# Patient Record
Sex: FEMALE | Race: WHITE | NOT HISPANIC OR LATINO | URBAN - METROPOLITAN AREA
[De-identification: names, ages, dates, MRNs, and addresses within clinical notes are randomized per-mention and may not be internally consistent; named-entity substitution may affect disease eponyms.]

---

## 2023-05-03 VITALS
SYSTOLIC BLOOD PRESSURE: 177 MMHG | RESPIRATION RATE: 16 BRPM | WEIGHT: 175.05 LBS | TEMPERATURE: 99 F | HEIGHT: 62 IN | OXYGEN SATURATION: 99 % | HEART RATE: 62 BPM | DIASTOLIC BLOOD PRESSURE: 89 MMHG

## 2023-05-03 RX ORDER — CHLORHEXIDINE GLUCONATE 213 G/1000ML
1 SOLUTION TOPICAL ONCE
Refills: 0 | Status: DISCONTINUED | OUTPATIENT
Start: 2023-05-04 | End: 2023-05-05

## 2023-05-03 NOTE — H&P ADULT - NSHPLABSRESULTS_GEN_ALL_CORE
14.3   6.93  )-----------( 203      ( 04 May 2023 11:57 )             41.5       05-04    139  |  104  |  16  ----------------------------<  93  4.0   |  24  |  0.94    Ca    9.8      04 May 2023 11:57  Mg     2.0     05-04    TPro  7.5  /  Alb  4.5  /  TBili  0.4  /  DBili  x   /  AST  23  /  ALT  20  /  AlkPhos  86  05-04      PT/INR - ( 04 May 2023 11:57 )   PT: 11.5 sec;   INR: 0.97          PTT - ( 04 May 2023 11:57 )  PTT:29.0 sec    CARDIAC MARKERS ( 04 May 2023 11:57 )  x     / x     / 77 U/L / x     / 2.7 ng/mL

## 2023-05-03 NOTE — H&P ADULT - HISTORY OF PRESENT ILLNESS
Cardiologist: Dr. Vides   Pharmacy:   Escort:     ***skeleton, please ask about surgery regarding breast CA****     72F PMHx OA, cerebral microvascular disease, breast CA (dx 10/22, clarify treatment/surgery), HLD, HTN, AI, CAD (+ CCTA 4/26/23)  presented to outpatient cardiologist, Dr. Vides, with complaints of CP w/ exertion. CCTA 4/26/23 Ca 91 (61%tile),  right dominant coronary arteries w/ noncalcified plaque in pRCA, 80-90% stenosis. Remained of atheromatous disease is nonobstructive. TTE 1/12/23 LVEF 60%, grade I DD, trace to mild MR, mild TR/AI, no RWMA. Patient denies KINSEY, SOB, palpitations, cough, abdominal pain, N/V/D, melena, BRBPR, LE swelling, fever, chills, or sick contacts.    In light of pts risk factors, CCS class III anginal symptoms and abnormal CCTA, patient now presents to [facility] for recommended cardiac catheterization with possible intervention if clinically indicated.   Cardiologist: Dr. Vides   Pharmacy: [place] [address]  Escort: Sarabjit ():  [phone]    73 y/o F, PMHx HTN, HLD, recently diagnosed Breast CA 10/2022, scheduled lumpectomy 5/8/23, initially presented to outpatient cardiologist, Dr. Vides, with complaints of CP w/ exertion, symptoms relieved with rest.  Subsequent CCTA 4/26/23 Ca 91 (61%tile),  right dominant coronary arteries w/ noncalcified plaque in pRCA, 80-90% stenosis. Remained of atheromatous disease is nonobstructive.  TTE 1/12/23 LVEF 60%, grade I DD, trace to mild MR, mild TR/AI, no RWMA. Patient denies Pt denies active CP, palpitations, diaphoresis, orthopnea, PND, dizziness, syncope, abdominal pain/discomfort, LE swelling or any other complaints at this time.     In light of pts risk factors, CCS class III anginal symptoms and abnormal CCTA, patient now presents to [facility] for recommended cardiac catheterization with possible intervention if clinically indicated with Dr Amaro.

## 2023-05-03 NOTE — H&P ADULT - NSICDXPASTMEDICALHX_GEN_ALL_CORE_FT
PAST MEDICAL HISTORY:  Aortic insufficiency     CAD (coronary artery disease)     Cerebral microvascular disease     HLD (hyperlipidemia)     HTN (hypertension)

## 2023-05-03 NOTE — H&P ADULT - ASSESSMENT
71 y/o F, PMHx HTN, HLD, recently diagnosed Breast CA 10/2022, scheduled lumpectomy 5/8/23, initially presented to outpatient cardiologist, Dr. Vides, with c/o class III anginal symptoms and found with subsequent CCTA  and now presents to [facility] for recommended cardiac catheterization with possible intervention if clinically indicated with Dr Amaro.     ASA:  III  Mallampati class: II	  Anginal Class III equivalent    Allergy Status: NKDA/NKFA  Office EKG 4/11/22:  SB @ 56bpm, no acute ischemic changes  --No  ASA/ Plavix:  Pt with scheduled Lumpectomy on Monday, 5/8/23.  (Per pt, breast area marked with magnet on 5/3/23)   --LABS:  Reviewed:  BUN/Cr : WNL.  EF preserved.  Euvolemic on exam. IV NS @  250cc bolus and 75cc x 2h started pre procedure    Sedation Plan:  Moderate    Patient Is Suitable Candidate For Sedation: Yes        Risks & benefits of procedure and sedation and risks and benefits for the alternative therapy have been explained to the patient in layman’s terms including but not limited to: allergic reaction, bleeding, infection, arrhythmia, respiratory compromise, renal and vascular compromise, limb damage, MI, CVA, emergent CABG/Vascular Surgery and death. Informed consent obtained and in chart.       71 y/o F, PMHx HTN, HLD, recently diagnosed Breast CA 10/2022, scheduled lumpectomy 5/8/23, initially presented to outpatient cardiologist, Dr. Vides, with c/o class III anginal symptoms and found with subsequent CCTA  and now presents to [facility] for recommended cardiac catheterization with possible intervention if clinically indicated with Dr Amaro.     ASA:  III  Mallampati class: II	  Anginal Class III equivalent    Allergy Status: NKDA/NKFA  Office EKG 4/11/22:  SB @ 56bpm, no acute ischemic changes  --No  ASA/ Plavix load given precath, Dr Amaro made aware. Pt with scheduled Lumpectomy on Monday, 5/8/23.  (Per pt, breast margins marked with magnet on 5/3/23. dressing intact)   --LABS:  Reviewed:  BUN/Cr : WNL.  EF preserved.  Euvolemic on exam. IV NS @  250cc bolus and 75cc x 2h started pre procedure    Sedation Plan:  Moderate    Patient Is Suitable Candidate For Sedation: Yes        Risks & benefits of procedure and sedation and risks and benefits for the alternative therapy have been explained to the patient in layman’s terms including but not limited to: allergic reaction, bleeding, infection, arrhythmia, respiratory compromise, renal and vascular compromise, limb damage, MI, CVA, emergent CABG/Vascular Surgery and death. Informed consent obtained and in chart.

## 2023-05-04 ENCOUNTER — INPATIENT (INPATIENT)
Facility: HOSPITAL | Age: 73
LOS: 0 days | Discharge: HOME | DRG: 247 | End: 2023-05-05
Attending: INTERNAL MEDICINE | Admitting: INTERNAL MEDICINE
Payer: COMMERCIAL

## 2023-05-04 LAB
A1C WITH ESTIMATED AVERAGE GLUCOSE RESULT: 5.1 % — SIGNIFICANT CHANGE UP (ref 4–5.6)
ALBUMIN SERPL ELPH-MCNC: 4.5 G/DL — SIGNIFICANT CHANGE UP (ref 3.3–5)
ALP SERPL-CCNC: 86 U/L — SIGNIFICANT CHANGE UP (ref 40–120)
ALT FLD-CCNC: 20 U/L — SIGNIFICANT CHANGE UP (ref 10–45)
ANION GAP SERPL CALC-SCNC: 11 MMOL/L — SIGNIFICANT CHANGE UP (ref 5–17)
APTT BLD: 29 SEC — SIGNIFICANT CHANGE UP (ref 27.5–35.5)
AST SERPL-CCNC: 23 U/L — SIGNIFICANT CHANGE UP (ref 10–40)
BASOPHILS # BLD AUTO: 0.05 K/UL — SIGNIFICANT CHANGE UP (ref 0–0.2)
BASOPHILS NFR BLD AUTO: 0.7 % — SIGNIFICANT CHANGE UP (ref 0–2)
BILIRUB SERPL-MCNC: 0.4 MG/DL — SIGNIFICANT CHANGE UP (ref 0.2–1.2)
BUN SERPL-MCNC: 16 MG/DL — SIGNIFICANT CHANGE UP (ref 7–23)
CALCIUM SERPL-MCNC: 9.8 MG/DL — SIGNIFICANT CHANGE UP (ref 8.4–10.5)
CHLORIDE SERPL-SCNC: 104 MMOL/L — SIGNIFICANT CHANGE UP (ref 96–108)
CHOLEST SERPL-MCNC: 217 MG/DL — HIGH
CK MB CFR SERPL CALC: 2.7 NG/ML — SIGNIFICANT CHANGE UP (ref 0–6.7)
CK SERPL-CCNC: 77 U/L — SIGNIFICANT CHANGE UP (ref 25–170)
CO2 SERPL-SCNC: 24 MMOL/L — SIGNIFICANT CHANGE UP (ref 22–31)
CREAT SERPL-MCNC: 0.94 MG/DL — SIGNIFICANT CHANGE UP (ref 0.5–1.3)
EGFR: 64 ML/MIN/1.73M2 — SIGNIFICANT CHANGE UP
EOSINOPHIL # BLD AUTO: 0.03 K/UL — SIGNIFICANT CHANGE UP (ref 0–0.5)
EOSINOPHIL NFR BLD AUTO: 0.4 % — SIGNIFICANT CHANGE UP (ref 0–6)
ESTIMATED AVERAGE GLUCOSE: 100 MG/DL — SIGNIFICANT CHANGE UP (ref 68–114)
GLUCOSE SERPL-MCNC: 93 MG/DL — SIGNIFICANT CHANGE UP (ref 70–99)
HCT VFR BLD CALC: 41.5 % — SIGNIFICANT CHANGE UP (ref 34.5–45)
HDLC SERPL-MCNC: 90 MG/DL — SIGNIFICANT CHANGE UP
HGB BLD-MCNC: 14.3 G/DL — SIGNIFICANT CHANGE UP (ref 11.5–15.5)
IMM GRANULOCYTES NFR BLD AUTO: 0.1 % — SIGNIFICANT CHANGE UP (ref 0–0.9)
INR BLD: 0.97 — SIGNIFICANT CHANGE UP (ref 0.88–1.16)
ISTAT ACTK (ACTIVATED CLOTTING TIME KAOLIN): 299 SEC — HIGH (ref 74–137)
LIPID PNL WITH DIRECT LDL SERPL: 109 MG/DL — HIGH
LYMPHOCYTES # BLD AUTO: 1.61 K/UL — SIGNIFICANT CHANGE UP (ref 1–3.3)
LYMPHOCYTES # BLD AUTO: 23.2 % — SIGNIFICANT CHANGE UP (ref 13–44)
MAGNESIUM SERPL-MCNC: 2 MG/DL — SIGNIFICANT CHANGE UP (ref 1.6–2.6)
MCHC RBC-ENTMCNC: 31 PG — SIGNIFICANT CHANGE UP (ref 27–34)
MCHC RBC-ENTMCNC: 34.5 GM/DL — SIGNIFICANT CHANGE UP (ref 32–36)
MCV RBC AUTO: 89.8 FL — SIGNIFICANT CHANGE UP (ref 80–100)
MONOCYTES # BLD AUTO: 0.54 K/UL — SIGNIFICANT CHANGE UP (ref 0–0.9)
MONOCYTES NFR BLD AUTO: 7.8 % — SIGNIFICANT CHANGE UP (ref 2–14)
NEUTROPHILS # BLD AUTO: 4.69 K/UL — SIGNIFICANT CHANGE UP (ref 1.8–7.4)
NEUTROPHILS NFR BLD AUTO: 67.8 % — SIGNIFICANT CHANGE UP (ref 43–77)
NON HDL CHOLESTEROL: 127 MG/DL — SIGNIFICANT CHANGE UP
NRBC # BLD: 0 /100 WBCS — SIGNIFICANT CHANGE UP (ref 0–0)
PLATELET # BLD AUTO: 203 K/UL — SIGNIFICANT CHANGE UP (ref 150–400)
POTASSIUM SERPL-MCNC: 4 MMOL/L — SIGNIFICANT CHANGE UP (ref 3.5–5.3)
POTASSIUM SERPL-SCNC: 4 MMOL/L — SIGNIFICANT CHANGE UP (ref 3.5–5.3)
PROT SERPL-MCNC: 7.5 G/DL — SIGNIFICANT CHANGE UP (ref 6–8.3)
PROTHROM AB SERPL-ACNC: 11.5 SEC — SIGNIFICANT CHANGE UP (ref 10.5–13.4)
RBC # BLD: 4.62 M/UL — SIGNIFICANT CHANGE UP (ref 3.8–5.2)
RBC # FLD: 12.4 % — SIGNIFICANT CHANGE UP (ref 10.3–14.5)
SODIUM SERPL-SCNC: 139 MMOL/L — SIGNIFICANT CHANGE UP (ref 135–145)
TRIGL SERPL-MCNC: 88 MG/DL — SIGNIFICANT CHANGE UP
WBC # BLD: 6.93 K/UL — SIGNIFICANT CHANGE UP (ref 3.8–10.5)
WBC # FLD AUTO: 6.93 K/UL — SIGNIFICANT CHANGE UP (ref 3.8–10.5)

## 2023-05-04 PROCEDURE — 92928 PRQ TCAT PLMT NTRAC ST 1 LES: CPT | Mod: RC

## 2023-05-04 PROCEDURE — 93458 L HRT ARTERY/VENTRICLE ANGIO: CPT | Mod: 26,59

## 2023-05-04 PROCEDURE — 93010 ELECTROCARDIOGRAM REPORT: CPT

## 2023-05-04 PROCEDURE — 99152 MOD SED SAME PHYS/QHP 5/>YRS: CPT

## 2023-05-04 RX ORDER — CLOPIDOGREL BISULFATE 75 MG/1
75 TABLET, FILM COATED ORAL DAILY
Refills: 0 | Status: DISCONTINUED | OUTPATIENT
Start: 2023-05-05 | End: 2023-05-05

## 2023-05-04 RX ORDER — SODIUM CHLORIDE 9 MG/ML
500 INJECTION INTRAMUSCULAR; INTRAVENOUS; SUBCUTANEOUS
Refills: 0 | Status: DISCONTINUED | OUTPATIENT
Start: 2023-05-04 | End: 2023-05-04

## 2023-05-04 RX ORDER — CLOPIDOGREL BISULFATE 75 MG/1
1 TABLET, FILM COATED ORAL
Refills: 0 | DISCHARGE

## 2023-05-04 RX ORDER — AMLODIPINE BESYLATE 2.5 MG/1
10 TABLET ORAL DAILY
Refills: 0 | Status: DISCONTINUED | OUTPATIENT
Start: 2023-05-04 | End: 2023-05-05

## 2023-05-04 RX ORDER — CYCLOBENZAPRINE HYDROCHLORIDE 10 MG/1
1 TABLET, FILM COATED ORAL
Refills: 0 | DISCHARGE

## 2023-05-04 RX ORDER — ASPIRIN/CALCIUM CARB/MAGNESIUM 324 MG
1 TABLET ORAL
Refills: 0 | DISCHARGE

## 2023-05-04 RX ORDER — RAMIPRIL 5 MG
1 CAPSULE ORAL
Refills: 0 | DISCHARGE

## 2023-05-04 RX ORDER — ATORVASTATIN CALCIUM 80 MG/1
40 TABLET, FILM COATED ORAL AT BEDTIME
Refills: 0 | Status: DISCONTINUED | OUTPATIENT
Start: 2023-05-04 | End: 2023-05-05

## 2023-05-04 RX ORDER — GABAPENTIN 400 MG/1
1 CAPSULE ORAL
Refills: 0 | DISCHARGE

## 2023-05-04 RX ORDER — METOPROLOL TARTRATE 50 MG
1 TABLET ORAL
Refills: 0 | DISCHARGE

## 2023-05-04 RX ORDER — ZOLPIDEM TARTRATE 10 MG/1
1 TABLET ORAL
Refills: 0 | DISCHARGE

## 2023-05-04 RX ORDER — SODIUM CHLORIDE 9 MG/ML
500 INJECTION INTRAMUSCULAR; INTRAVENOUS; SUBCUTANEOUS
Refills: 0 | Status: DISCONTINUED | OUTPATIENT
Start: 2023-05-04 | End: 2023-05-05

## 2023-05-04 RX ORDER — ASPIRIN/CALCIUM CARB/MAGNESIUM 324 MG
81 TABLET ORAL DAILY
Refills: 0 | Status: DISCONTINUED | OUTPATIENT
Start: 2023-05-05 | End: 2023-05-05

## 2023-05-04 RX ORDER — SODIUM CHLORIDE 9 MG/ML
250 INJECTION INTRAMUSCULAR; INTRAVENOUS; SUBCUTANEOUS ONCE
Refills: 0 | Status: DISCONTINUED | OUTPATIENT
Start: 2023-05-04 | End: 2023-05-04

## 2023-05-04 RX ORDER — RANOLAZINE 500 MG/1
1 TABLET, FILM COATED, EXTENDED RELEASE ORAL
Refills: 0 | DISCHARGE

## 2023-05-04 RX ADMIN — AMLODIPINE BESYLATE 10 MILLIGRAM(S): 2.5 TABLET ORAL at 19:21

## 2023-05-04 RX ADMIN — SODIUM CHLORIDE 235 MILLILITER(S): 9 INJECTION INTRAMUSCULAR; INTRAVENOUS; SUBCUTANEOUS at 18:18

## 2023-05-04 RX ADMIN — ATORVASTATIN CALCIUM 40 MILLIGRAM(S): 80 TABLET, FILM COATED ORAL at 22:19

## 2023-05-04 NOTE — PATIENT PROFILE ADULT - FALL HARM RISK - HARM RISK INTERVENTIONS

## 2023-05-05 VITALS — TEMPERATURE: 98 F

## 2023-05-05 LAB
ANION GAP SERPL CALC-SCNC: 9 MMOL/L — SIGNIFICANT CHANGE UP (ref 5–17)
BUN SERPL-MCNC: 14 MG/DL — SIGNIFICANT CHANGE UP (ref 7–23)
CALCIUM SERPL-MCNC: 8.3 MG/DL — LOW (ref 8.4–10.5)
CHLORIDE SERPL-SCNC: 108 MMOL/L — SIGNIFICANT CHANGE UP (ref 96–108)
CO2 SERPL-SCNC: 22 MMOL/L — SIGNIFICANT CHANGE UP (ref 22–31)
CREAT SERPL-MCNC: 0.88 MG/DL — SIGNIFICANT CHANGE UP (ref 0.5–1.3)
EGFR: 69 ML/MIN/1.73M2 — SIGNIFICANT CHANGE UP
GLUCOSE SERPL-MCNC: 103 MG/DL — HIGH (ref 70–99)
HCT VFR BLD CALC: 35.4 % — SIGNIFICANT CHANGE UP (ref 34.5–45)
HCV AB S/CO SERPL IA: 0.13 S/CO — SIGNIFICANT CHANGE UP (ref 0–0.99)
HCV AB SERPL-IMP: SIGNIFICANT CHANGE UP
HGB BLD-MCNC: 12.1 G/DL — SIGNIFICANT CHANGE UP (ref 11.5–15.5)
MAGNESIUM SERPL-MCNC: 1.9 MG/DL — SIGNIFICANT CHANGE UP (ref 1.6–2.6)
MCHC RBC-ENTMCNC: 31.4 PG — SIGNIFICANT CHANGE UP (ref 27–34)
MCHC RBC-ENTMCNC: 34.2 GM/DL — SIGNIFICANT CHANGE UP (ref 32–36)
MCV RBC AUTO: 91.9 FL — SIGNIFICANT CHANGE UP (ref 80–100)
NRBC # BLD: 0 /100 WBCS — SIGNIFICANT CHANGE UP (ref 0–0)
PLATELET # BLD AUTO: 167 K/UL — SIGNIFICANT CHANGE UP (ref 150–400)
POTASSIUM SERPL-MCNC: 4 MMOL/L — SIGNIFICANT CHANGE UP (ref 3.5–5.3)
POTASSIUM SERPL-SCNC: 4 MMOL/L — SIGNIFICANT CHANGE UP (ref 3.5–5.3)
RBC # BLD: 3.85 M/UL — SIGNIFICANT CHANGE UP (ref 3.8–5.2)
RBC # FLD: 12.4 % — SIGNIFICANT CHANGE UP (ref 10.3–14.5)
SODIUM SERPL-SCNC: 139 MMOL/L — SIGNIFICANT CHANGE UP (ref 135–145)
WBC # BLD: 5.49 K/UL — SIGNIFICANT CHANGE UP (ref 3.8–10.5)
WBC # FLD AUTO: 5.49 K/UL — SIGNIFICANT CHANGE UP (ref 3.8–10.5)

## 2023-05-05 PROCEDURE — 80048 BASIC METABOLIC PNL TOTAL CA: CPT

## 2023-05-05 PROCEDURE — C1874: CPT

## 2023-05-05 PROCEDURE — 85730 THROMBOPLASTIN TIME PARTIAL: CPT

## 2023-05-05 PROCEDURE — 85347 COAGULATION TIME ACTIVATED: CPT

## 2023-05-05 PROCEDURE — C1725: CPT

## 2023-05-05 PROCEDURE — 83036 HEMOGLOBIN GLYCOSYLATED A1C: CPT

## 2023-05-05 PROCEDURE — C1769: CPT

## 2023-05-05 PROCEDURE — 92928 PRQ TCAT PLMT NTRAC ST 1 LES: CPT

## 2023-05-05 PROCEDURE — 82550 ASSAY OF CK (CPK): CPT

## 2023-05-05 PROCEDURE — C1894: CPT

## 2023-05-05 PROCEDURE — 82553 CREATINE MB FRACTION: CPT

## 2023-05-05 PROCEDURE — C7517: CPT

## 2023-05-05 PROCEDURE — 99239 HOSP IP/OBS DSCHRG MGMT >30: CPT

## 2023-05-05 PROCEDURE — 85610 PROTHROMBIN TIME: CPT

## 2023-05-05 PROCEDURE — 93005 ELECTROCARDIOGRAM TRACING: CPT

## 2023-05-05 PROCEDURE — 85025 COMPLETE CBC W/AUTO DIFF WBC: CPT

## 2023-05-05 PROCEDURE — 93010 ELECTROCARDIOGRAM REPORT: CPT

## 2023-05-05 PROCEDURE — 80061 LIPID PANEL: CPT

## 2023-05-05 PROCEDURE — 36415 COLL VENOUS BLD VENIPUNCTURE: CPT

## 2023-05-05 PROCEDURE — 80053 COMPREHEN METABOLIC PANEL: CPT

## 2023-05-05 PROCEDURE — 86803 HEPATITIS C AB TEST: CPT

## 2023-05-05 PROCEDURE — 85027 COMPLETE CBC AUTOMATED: CPT

## 2023-05-05 PROCEDURE — 83735 ASSAY OF MAGNESIUM: CPT

## 2023-05-05 PROCEDURE — C1887: CPT

## 2023-05-05 RX ORDER — ATORVASTATIN CALCIUM 80 MG/1
1 TABLET, FILM COATED ORAL
Refills: 0 | DISCHARGE

## 2023-05-05 RX ORDER — MAGNESIUM OXIDE 400 MG ORAL TABLET 241.3 MG
400 TABLET ORAL ONCE
Refills: 0 | Status: COMPLETED | OUTPATIENT
Start: 2023-05-05 | End: 2023-05-05

## 2023-05-05 RX ORDER — CLOPIDOGREL BISULFATE 75 MG/1
1 TABLET, FILM COATED ORAL
Qty: 30 | Refills: 11
Start: 2023-05-05 | End: 2024-04-28

## 2023-05-05 RX ORDER — ATORVASTATIN CALCIUM 80 MG/1
1 TABLET, FILM COATED ORAL
Qty: 30 | Refills: 3
Start: 2023-05-05 | End: 2023-09-01

## 2023-05-05 RX ORDER — ASPIRIN/CALCIUM CARB/MAGNESIUM 324 MG
1 TABLET ORAL
Qty: 30 | Refills: 11
Start: 2023-05-05 | End: 2024-04-28

## 2023-05-05 RX ADMIN — MAGNESIUM OXIDE 400 MG ORAL TABLET 400 MILLIGRAM(S): 241.3 TABLET ORAL at 10:17

## 2023-05-05 RX ADMIN — CLOPIDOGREL BISULFATE 75 MILLIGRAM(S): 75 TABLET, FILM COATED ORAL at 10:17

## 2023-05-05 RX ADMIN — Medication 81 MILLIGRAM(S): at 10:17

## 2023-05-05 NOTE — DISCHARGE NOTE NURSING/CASE MANAGEMENT/SOCIAL WORK - NSDCPEFALRISK_GEN_ALL_CORE
For information on Fall & Injury Prevention, visit: [email or web address] OR  [email or web address] OR  https://www.cdc.gov/steadi/patient.html

## 2023-05-05 NOTE — DISCHARGE NOTE PROVIDER - NSDCFUADDINST_GEN_ALL_CORE_FT
We recommend a Mediterranean diet: Some suggestions include continue incorporating 2 or more servings per day of vegetables, fruits, and whole grains. Increase intake of fish and legumes/beans to 2 or more servings per week. Aim to increase intake of healthy fats, such as olive oil and avocados, and have a handful of nuts/seeds most days. Reduce red/processed meat consumption to 2 or fewer times per week

## 2023-05-05 NOTE — DISCHARGE NOTE PROVIDER - HOSPITAL COURSE
INCOMPLETE - NEEDS MED REC AND CARDS REHAB RX    71 y/o F, PMHx HTN, HLD, recently diagnosed Breast CA 10/2022, scheduled lumpectomy 5/8/23, initially presented to outpatient cardiologist, Dr. Vides, with complaints of CP w/ exertion, symptoms relieved with rest.  Subsequent CCTA 4/26/23 Ca 91 (61%tile),  right dominant coronary arteries w/ noncalcified plaque in pRCA, 80-90% stenosis. Remained of atheromatous disease is nonobstructive.  TTE 1/12/23 LVEF 60%, grade I DD, trace to mild MR, mild TR/AI, no RWMA. In light of pts risk factors, CCS class III anginal symptoms and abnormal CCTA, patient now presents to [facility] for recommended cardiac catheterization with possible intervention if clinically indicated with Dr Amaro.     S/p cardiac cath 5/423: JENNIFER pRCA (80%). Other findings: mLCx 20-30%, pLAD 30%, EDP 7mmHg, EF 60% by ECHO, R TR @ 7:30 PM     Pt planned for lumpectomy. She will continue DAPT for one month and then proceed with lumpectomy. She will then resume DAPT post surgery.    Pt is asymptomatic at this time and denies chest pain, SOB, KINSEY, palpitations, dizziness, LOC, N/V, diaphoresis, orthopnea/PND, and leg swelling. Pt able to ambulate and void without complication. VSS. Labs and telemetry reviewed. ___________ access site stable and dressing C/D/I.  Pt is a candidate for discharge per  ________. Pt given appropriate discharge instructions, pt states they have an appropriate amount of their previous home meds unchanged from this visit at home, and any new medications were sent to their pharmacy. Pt instructed to f/u with ________ in 1-2 weeks.           Cardiac Rehab (STEMI/NSTEMI/ACS/Unstable Angina/CHF/Post PCI):            *Education on benefits of Cardiac Rehab provided to patient: Yes         *Referral and Prescription Given for Cardiac Rehab : Yes         *Pt given list of locations & instructed to contact their insurance company to review list of participating providers    Statin Prescribed (STEMI/NSTEMI/UA &/OR PCI this admission):  Yes  DAPT: Prescriptions for Aspirin/Plavix/Brilinta/Effient e-prescribed to patient's pharmacy Yes   73 y/o F, PMHx HTN, HLD, recently diagnosed Breast CA 10/2022, scheduled lumpectomy 5/8/23, initially presented to outpatient cardiologist, Dr. Vides, with complaints of CP w/ exertion, symptoms relieved with rest.  Subsequent CCTA 4/26/23 Ca 91 (61%tile),  right dominant coronary arteries w/ noncalcified plaque in pRCA, 80-90% stenosis. Remained of atheromatous disease is nonobstructive.  TTE 1/12/23 LVEF 60%, grade I DD, trace to mild MR, mild TR/AI, no RWMA. In light of pts risk factors, CCS class III anginal symptoms and abnormal CCTA, patient now presents to [facility] for recommended cardiac catheterization with possible intervention if clinically indicated with Dr Amaro.     S/p cardiac cath 5/423: JENNIFER pRCA (80%). Other findings: mLCx 20-30%, pLAD 30%, EDP 7mmHg, EF 60% by ECHO, R radial access stable.      Pt planned for lumpectomy. She will continue DAPT for one month and then proceed with lumpectomy. She will then resume DAPT post surgery. She will otherwise continue home amlodipine 10mg qd. The statin therapy was increased from atorvastatin 20mg to 40mg qd ().     Pt is asymptomatic at this time and denies chest pain, SOB, KINSEY, palpitations, dizziness, LOC, N/V, diaphoresis, orthopnea/PND, and leg swelling. Pt able to ambulate and void without complication. VSS. Labs and telemetry reviewed. Right radial access site stable and dressing C/D/I.  Pt is a candidate for discharge per Dr. Khan. Pt given appropriate discharge instructions, pt states they have an appropriate amount of their previous home meds unchanged from this visit at home, and any new medications were sent to their pharmacy. Pt instructed to f/u with Dr. Vides in 1-2 weeks.           Cardiac Rehab (STEMI/NSTEMI/ACS/Unstable Angina/CHF/Post PCI):            *Education on benefits of Cardiac Rehab provided to patient: Yes         *Referral and Prescription Given for Cardiac Rehab : Yes         *Pt given list of locations & instructed to contact their insurance company to review list of participating providers    Statin Prescribed (STEMI/NSTEMI/UA &/OR PCI this admission):  Yes  DAPT: Prescriptions for Aspirin/Plavix/Brilinta/Effient e-prescribed to patient's pharmacy Yes   73 y/o F, PMHx HTN, HLD, recently diagnosed Breast CA 10/2022, scheduled lumpectomy 5/8/23, initially presented to outpatient cardiologist, Dr. Vides, with complaints of CP w/ exertion, symptoms relieved with rest.  Subsequent CCTA 4/26/23 Ca 91 (61%tile),  right dominant coronary arteries w/ noncalcified plaque in pRCA, 80-90% stenosis. Remained of atheromatous disease is nonobstructive.  TTE 1/12/23 LVEF 60%, grade I DD, trace to mild MR, mild TR/AI, no RWMA. In light of pts risk factors, CCS class III anginal symptoms and abnormal CCTA, patient now presents to [facility] for recommended cardiac catheterization with possible intervention if clinically indicated with Dr Amaro.     S/p cardiac cath 5/423: JENNIFER pRCA (80%). Other findings: mLCx 20-30%, pLAD 30%, EDP 7mmHg, EF 60% by ECHO, R radial access stable.      Pt planned for lumpectomy. She will continue DAPT for one month and then proceed with lumpectomy. She will then resume DAPT post surgery. She will otherwise continue home amlodipine 10mg qd. The statin therapy was increased from atorvastatin 20mg to 40mg qd ().     Pt is asymptomatic at this time and denies chest pain, SOB, KINSEY, palpitations, dizziness, LOC, N/V, diaphoresis, orthopnea/PND, and leg swelling. Pt able to ambulate and void without complication. VSS. Labs and telemetry reviewed. Right radial access site stable and dressing C/D/I.  Pt is a candidate for discharge per Dr. Khan. Pt given appropriate discharge instructions, pt states they have an appropriate amount of their previous home meds unchanged from this visit at home, and any new medications were sent to their pharmacy. Pt instructed to f/u with Dr. Vides in 1-2 weeks.           Cardiac Rehab (Post PCI):            *Education on benefits of Cardiac Rehab provided to patient: Yes         *Referral and Prescription Given for Cardiac Rehab : Yes         *Pt given list of locations & instructed to contact their insurance company to review list of participating providers    Statin Prescribed (STEMI/NSTEMI/UA &/OR PCI this admission):  Yes  DAPT: Prescriptions for Aspirin/Plavix e-prescribed to patient's pharmacy Yes

## 2023-05-05 NOTE — DISCHARGE NOTE PROVIDER - CARE PROVIDER_API CALL
Shilo Vides (MD)  Cardiovascular Disease  [address] [place] [address]  Phone: [phone]  Fax: [phone]  Established Patient  Follow Up Time: 2 weeks

## 2023-05-05 NOTE — DISCHARGE NOTE PROVIDER - NSDCMRMEDTOKEN_GEN_ALL_CORE_FT
amLODIPine 10 mg oral tablet: 1 orally once a day  Lipitor 10 mg oral tablet: 1 orally once a day   amLODIPine 10 mg oral tablet: 1 orally once a day  atorvastatin 40 mg oral tablet: 1 tab(s) orally once a day (at bedtime)  Lipitor 10 mg oral tablet: 1 orally once a day   amLODIPine 10 mg oral tablet: 1 orally once a day  Aspirin Enteric Coated 81 mg oral delayed release tablet: 1 tab(s) orally once a day  atorvastatin 40 mg oral tablet: 1 tab(s) orally once a day (at bedtime)  cardiac rehab, 3 x/week x 12 weeks for diagnosis CAD. Cardiologist Dr. Vides: cardiac rehab, 3 x/week x 12 weeks for diagnosis CAD. Cardiologist Dr. Vides  clopidogrel 75 mg oral tablet: 1 tab(s) orally once a day

## 2023-05-05 NOTE — DISCHARGE NOTE NURSING/CASE MANAGEMENT/SOCIAL WORK - PATIENT PORTAL LINK FT
You can access the FollowMyHealth Patient Portal offered by [facility] by registering at the following website: [email or web address]. By joining [employer]’s FollowMyHealth portal, you will also be able to view your health information using other applications (apps) compatible with our system.

## 2023-05-05 NOTE — DISCHARGE NOTE PROVIDER - NSDCCPCAREPLAN_GEN_ALL_CORE_FT
PRINCIPAL DISCHARGE DIAGNOSIS  Diagnosis: CAD (coronary artery disease)  Assessment and Plan of Treatment: -You underwent a cardiac catheterization 5/5/23 and the blockage in your right coronary artery was opened with stent placement. NEVER MISS A DOSE OF ASPIRIN OR PLAVIX; IF YOU DO, YOU ARE AT RISK OF YOUR STENT CLOSING AND HAVING A HEART ATTACK. DO NOT STOP THESE TWO MEDICATIONS UNLESS INSTRUCTED TO DO SO BY YOUR CARDIOLOGIST. Your procedure was done through your groin or your wrist. You do not need to keep this area covered and you may shower. Please avoid any heavy lifting  (no more than 3 to 5 lbs) or strenuous activity for five days. If you develop any swelling, bleeding, hardening of the skin (hematoma formation), acute pain, numbness/tingling  in your arm or leg please contact your doctor immediately or call our 24/7 line: [phone] Please return to the hospital/seek immediate medical attention if worsening of symptoms- including not limited to chest pain, shortness of breath. Please follow up with Dr. Vides in 1-2 weeks. Please call his office and make an appointment to see him. Please continue a heart healthy diet low in sodium, cholesterol, and fat.  - Please take aspirin and plavix daily. You may schedule the lumpectomy AFTER one month of taking aspirin and plavix. Please resume these medications after the surgery.     PRINCIPAL DISCHARGE DIAGNOSIS  Diagnosis: CAD (coronary artery disease)  Assessment and Plan of Treatment: -You underwent a cardiac catheterization 5/4/23 and the blockage in your right coronary artery was opened with stent placement. NEVER MISS A DOSE OF ASPIRIN OR PLAVIX; IF YOU DO, YOU ARE AT RISK OF YOUR STENT CLOSING AND HAVING A HEART ATTACK. DO NOT STOP THESE TWO MEDICATIONS UNLESS INSTRUCTED TO DO SO BY YOUR CARDIOLOGIST. Your procedure was done through your wrist. You do not need to keep this area covered and you may shower. Please avoid any heavy lifting  (no more than 3 to 5 lbs) or strenuous activity for five days. If you develop any swelling, bleeding, hardening of the skin (hematoma formation), acute pain, numbness/tingling  in your arm or leg please contact your doctor immediately or call our 24/7 line: [phone] Please return to the hospital/seek immediate medical attention if worsening of symptoms- including not limited to chest pain, shortness of breath. Please follow up with Dr. Vides in 1-2 weeks. Please call his office and make an appointment to see him. Please continue a heart healthy diet low in sodium, cholesterol, and fat.  - Please take aspirin 81mg once a day and plavix 75mg once daily. You may schedule the lumpectomy AFTER one month of taking aspirin and plavix. Please resume these medications after the surgery.      SECONDARY DISCHARGE DIAGNOSES  Diagnosis: HLD (hyperlipidemia)  Assessment and Plan of Treatment: Your . Your atorvastatin was increased from 20mg to 40mg once a day. Please continue increased dose at bedtime to keep your cholesterol low. High cholesterol contributes to heart disease.      Diagnosis: HTN (hypertension)  Assessment and Plan of Treatment: Please continue home med amlodipine as listed to keep your blood pressure controlled. For blood pressure that is too high or too low please see your doctor or go to the emergency room as necessary.

## 2023-05-10 DIAGNOSIS — I25.10 ATHEROSCLEROTIC HEART DISEASE OF NATIVE CORONARY ARTERY WITHOUT ANGINA PECTORIS: ICD-10-CM

## 2023-05-10 DIAGNOSIS — I25.110 ATHEROSCLEROTIC HEART DISEASE OF NATIVE CORONARY ARTERY WITH UNSTABLE ANGINA PECTORIS: ICD-10-CM

## 2023-05-10 DIAGNOSIS — I10 ESSENTIAL (PRIMARY) HYPERTENSION: ICD-10-CM

## 2023-05-10 DIAGNOSIS — I08.0 RHEUMATIC DISORDERS OF BOTH MITRAL AND AORTIC VALVES: ICD-10-CM

## 2023-05-10 DIAGNOSIS — Z85.3 PERSONAL HISTORY OF MALIGNANT NEOPLASM OF BREAST: ICD-10-CM

## 2023-05-10 DIAGNOSIS — Z91.09 OTHER ALLERGY STATUS, OTHER THAN TO DRUGS AND BIOLOGICAL SUBSTANCES: ICD-10-CM

## 2023-05-10 DIAGNOSIS — Z88.1 ALLERGY STATUS TO OTHER ANTIBIOTIC AGENTS STATUS: ICD-10-CM

## 2023-05-10 DIAGNOSIS — E78.5 HYPERLIPIDEMIA, UNSPECIFIED: ICD-10-CM

## 2023-07-25 ENCOUNTER — INPATIENT (INPATIENT)
Facility: HOSPITAL | Age: 73
LOS: 0 days | Discharge: LEFT BEFORE DISCHARGE | DRG: 313 | End: 2023-07-26
Attending: STUDENT IN AN ORGANIZED HEALTH CARE EDUCATION/TRAINING PROGRAM | Admitting: STUDENT IN AN ORGANIZED HEALTH CARE EDUCATION/TRAINING PROGRAM
Payer: MEDICARE

## 2023-07-25 VITALS
TEMPERATURE: 98 F | HEART RATE: 69 BPM | SYSTOLIC BLOOD PRESSURE: 188 MMHG | WEIGHT: 175.05 LBS | DIASTOLIC BLOOD PRESSURE: 91 MMHG | HEIGHT: 62 IN | RESPIRATION RATE: 18 BRPM | OXYGEN SATURATION: 98 %

## 2023-07-25 LAB
ALBUMIN SERPL ELPH-MCNC: 4.7 G/DL — SIGNIFICANT CHANGE UP (ref 3.3–5)
ALP SERPL-CCNC: 112 U/L — SIGNIFICANT CHANGE UP (ref 40–120)
ALT FLD-CCNC: 44 U/L — SIGNIFICANT CHANGE UP (ref 10–45)
ANION GAP SERPL CALC-SCNC: 12 MMOL/L — SIGNIFICANT CHANGE UP (ref 5–17)
APPEARANCE UR: CLEAR — SIGNIFICANT CHANGE UP
AST SERPL-CCNC: 34 U/L — SIGNIFICANT CHANGE UP (ref 10–40)
BASOPHILS # BLD AUTO: 0.03 K/UL — SIGNIFICANT CHANGE UP (ref 0–0.2)
BASOPHILS NFR BLD AUTO: 0.4 % — SIGNIFICANT CHANGE UP (ref 0–2)
BILIRUB SERPL-MCNC: 0.4 MG/DL — SIGNIFICANT CHANGE UP (ref 0.2–1.2)
BILIRUB UR-MCNC: NEGATIVE — SIGNIFICANT CHANGE UP
BUN SERPL-MCNC: 17 MG/DL — SIGNIFICANT CHANGE UP (ref 7–23)
CALCIUM SERPL-MCNC: 10 MG/DL — SIGNIFICANT CHANGE UP (ref 8.4–10.5)
CHLORIDE SERPL-SCNC: 106 MMOL/L — SIGNIFICANT CHANGE UP (ref 96–108)
CK MB CFR SERPL CALC: 2.7 NG/ML — SIGNIFICANT CHANGE UP (ref 0–6.7)
CK SERPL-CCNC: 94 U/L — SIGNIFICANT CHANGE UP (ref 25–170)
CO2 SERPL-SCNC: 26 MMOL/L — SIGNIFICANT CHANGE UP (ref 22–31)
COLOR SPEC: YELLOW — SIGNIFICANT CHANGE UP
CREAT SERPL-MCNC: 1.09 MG/DL — SIGNIFICANT CHANGE UP (ref 0.5–1.3)
DIFF PNL FLD: NEGATIVE — SIGNIFICANT CHANGE UP
EGFR: 53 ML/MIN/1.73M2 — LOW
EOSINOPHIL # BLD AUTO: 0.04 K/UL — SIGNIFICANT CHANGE UP (ref 0–0.5)
EOSINOPHIL NFR BLD AUTO: 0.6 % — SIGNIFICANT CHANGE UP (ref 0–6)
GLUCOSE SERPL-MCNC: 101 MG/DL — HIGH (ref 70–99)
GLUCOSE UR QL: NEGATIVE — SIGNIFICANT CHANGE UP
HCT VFR BLD CALC: 39.1 % — SIGNIFICANT CHANGE UP (ref 34.5–45)
HGB BLD-MCNC: 13.6 G/DL — SIGNIFICANT CHANGE UP (ref 11.5–15.5)
IMM GRANULOCYTES NFR BLD AUTO: 0.3 % — SIGNIFICANT CHANGE UP (ref 0–0.9)
KETONES UR-MCNC: NEGATIVE — SIGNIFICANT CHANGE UP
LEUKOCYTE ESTERASE UR-ACNC: NEGATIVE — SIGNIFICANT CHANGE UP
LYMPHOCYTES # BLD AUTO: 1.15 K/UL — SIGNIFICANT CHANGE UP (ref 1–3.3)
LYMPHOCYTES # BLD AUTO: 16.8 % — SIGNIFICANT CHANGE UP (ref 13–44)
MCHC RBC-ENTMCNC: 32 PG — SIGNIFICANT CHANGE UP (ref 27–34)
MCHC RBC-ENTMCNC: 34.8 GM/DL — SIGNIFICANT CHANGE UP (ref 32–36)
MCV RBC AUTO: 92 FL — SIGNIFICANT CHANGE UP (ref 80–100)
MONOCYTES # BLD AUTO: 0.61 K/UL — SIGNIFICANT CHANGE UP (ref 0–0.9)
MONOCYTES NFR BLD AUTO: 8.9 % — SIGNIFICANT CHANGE UP (ref 2–14)
NEUTROPHILS # BLD AUTO: 4.99 K/UL — SIGNIFICANT CHANGE UP (ref 1.8–7.4)
NEUTROPHILS NFR BLD AUTO: 73 % — SIGNIFICANT CHANGE UP (ref 43–77)
NITRITE UR-MCNC: NEGATIVE — SIGNIFICANT CHANGE UP
NRBC # BLD: 0 /100 WBCS — SIGNIFICANT CHANGE UP (ref 0–0)
PH UR: 7 — SIGNIFICANT CHANGE UP (ref 5–8)
PLATELET # BLD AUTO: 206 K/UL — SIGNIFICANT CHANGE UP (ref 150–400)
POTASSIUM SERPL-MCNC: 4.3 MMOL/L — SIGNIFICANT CHANGE UP (ref 3.5–5.3)
POTASSIUM SERPL-SCNC: 4.3 MMOL/L — SIGNIFICANT CHANGE UP (ref 3.5–5.3)
PROT SERPL-MCNC: 7.7 G/DL — SIGNIFICANT CHANGE UP (ref 6–8.3)
PROT UR-MCNC: NEGATIVE MG/DL — SIGNIFICANT CHANGE UP
RBC # BLD: 4.25 M/UL — SIGNIFICANT CHANGE UP (ref 3.8–5.2)
RBC # FLD: 12.5 % — SIGNIFICANT CHANGE UP (ref 10.3–14.5)
SODIUM SERPL-SCNC: 144 MMOL/L — SIGNIFICANT CHANGE UP (ref 135–145)
SP GR SPEC: 1.01 — SIGNIFICANT CHANGE UP (ref 1–1.03)
TROPONIN T, HIGH SENSITIVITY RESULT: 10 NG/L — SIGNIFICANT CHANGE UP (ref 0–51)
TROPONIN T, HIGH SENSITIVITY RESULT: 12 NG/L — SIGNIFICANT CHANGE UP (ref 0–51)
UROBILINOGEN FLD QL: 0.2 E.U./DL — SIGNIFICANT CHANGE UP
WBC # BLD: 6.84 K/UL — SIGNIFICANT CHANGE UP (ref 3.8–10.5)
WBC # FLD AUTO: 6.84 K/UL — SIGNIFICANT CHANGE UP (ref 3.8–10.5)

## 2023-07-25 PROCEDURE — 99222 1ST HOSP IP/OBS MODERATE 55: CPT

## 2023-07-25 PROCEDURE — 99285 EMERGENCY DEPT VISIT HI MDM: CPT | Mod: FS

## 2023-07-25 PROCEDURE — 71045 X-RAY EXAM CHEST 1 VIEW: CPT | Mod: 26

## 2023-07-25 RX ORDER — AMLODIPINE BESYLATE 2.5 MG/1
10 TABLET ORAL ONCE
Refills: 0 | Status: COMPLETED | OUTPATIENT
Start: 2023-07-25 | End: 2023-07-25

## 2023-07-25 RX ADMIN — AMLODIPINE BESYLATE 10 MILLIGRAM(S): 2.5 TABLET ORAL at 22:35

## 2023-07-25 NOTE — ED ADULT TRIAGE NOTE - CHIEF COMPLAINT QUOTE
Pt to ED c/o chest discomfort "Like palpitations". at 03:00. Denies chest pain nor dizziness at this time. Hx of Cardiac stent.

## 2023-07-25 NOTE — ED ADULT NURSE NOTE - NSFALLUNIVINTERV_ED_ALL_ED
[NL] : regular rate and rhythm, normal S1, S2 audible, no murmurs [Miguel Angel: ____] : Miguel Angel [unfilled] [Vaginal Discharge] : vaginal discharge [FreeTextEntry6] : +  pseudofolliculitis/few scattered bumps; + beefy red appearance/erythema of introitus; + copious, adherent, white, cottage-cheese like discharge along vaginal walls and at cervix, cervix closed, no CMT, no adnexal tenderness Bed/Stretcher in lowest position, wheels locked, appropriate side rails in place/Call bell, personal items and telephone in reach/Instruct patient to call for assistance before getting out of bed/chair/stretcher/Non-slip footwear applied when patient is off stretcher/[place] to call system/Physically safe environment - no spills, clutter or unnecessary equipment/Purposeful proactive rounding/Room/bathroom lighting operational, light cord in reach

## 2023-07-25 NOTE — ED ADULT NURSE NOTE - PRIMARY CARE PROVIDER
"Nutrition services: Day 0 of admit.  Stevie Phoenix is a 57 y.o. male with admitting DX of Acute UTI.  Consult received for high risk diagnosis of Stage 4 pressure ulcer.    Assessment:  Height: 190.5 cm (6' 3\")  Weight: 77.1 kg (170 lb)  Body mass index is 21.25 kg/m²., BMI classification: Normal  Diet/Intake: Cardiac    Evaluation:   1. Pt with history of paraplegia. Pt with pressure injuries to sacrum, right ischium, left toe and right calf per nursing note. Wound team evaluation pending.  2. Recorded PO intake good at % at breakfast today. Pt is aware of need for protein. He states he would like a Boost at lunch time.  3. Per chart review, Arginaid was ordered at recent visit, but was discontinued as pt was not drinking it.  4. No BM x 11 days per H&P. Stool x 1 today.  5. Medications include MVI.    Malnutrition Risk: Criteria not met.    Recommendations/Plan:  1. Boost Plus with lunch daily.   2. Encourage continued good PO intake of meals >50%.  3. Document intake of all meals and supplements as % taken in ADL's to provide interdisciplinary communication across all shifts.   4. Monitor weight.  5. Nutrition rep will continue to see patient for ongoing meal and snack preferences.   6. RD to monitor weekly per department guidelines.          " unknown

## 2023-07-25 NOTE — ED PROVIDER NOTE - CLINICAL SUMMARY MEDICAL DECISION MAKING FREE TEXT BOX
discussed w dr greco [phone]. recs admission for cardiac cta. pt agreeable history of htn, hld, cad s/p stent 5/2023, here w episodes of upper abd into chest burning throughout the day. sent by her cardiologist.   pt well appearing, stable vitals - mild hypertensive. exam unremarkable   ecg nonischemic  labs unremarkable w HS trop 10 -> 12. delta 2  discussed w dr greco [phone]. recs admission for cardiac cta. pt agreeable

## 2023-07-25 NOTE — ED ADULT TRIAGE NOTE - SPO2 (%)
98 Elidel Pregnancy And Lactation Text: This medication is Pregnancy Category C. It is unknown if this medication is excreted in breast milk.

## 2023-07-25 NOTE — ED ADULT NURSE NOTE - OBJECTIVE STATEMENT
72yoF came to ED c/o chest discomfort since and warmth since 22:00 last night. Pt states " I had a stent placed last month, but last night I had chest discomfort and this warm feeling from my chest that radiated up to my cheeks. I no longer have the chest discomfort but still am getting that warmth feeling. I called my cardiologist and he told me to come in". Patient denies chest pain SOB, numbness and tingling. Patient in complete sentences. EKG performed

## 2023-07-25 NOTE — ED ADULT NURSE NOTE - DRUG PRE-SCREENING (DAST -1)
----- Message from Dagoberto Alejandre MD sent at 12/23/2021  9:12 AM CST -----  Increase SYNTHROID to 7.5 tablets a week , repeat TSH, Tg reflex before appointment    Statement Selected

## 2023-07-25 NOTE — ED PROVIDER NOTE - OBJECTIVE STATEMENT
stent placed 5/2023. on asa/plavix, compliant w meds  at 3am had upper abd burning 72 yr old female, history of htn, hld, cad s/p stent 5/2023, recent dx breast cancer. presents to the Emergency Department w chest discomfort. pt reports upper abd burning that woke her from sleep around 3am. flush feeling to chest and face. lasted a few mins and resolved on its own. similar episodes throughout the day. nonexertional. non pleuritic. intermittent palpitations.   no sob, dizziness, near-syncope, leg swelling.

## 2023-07-25 NOTE — ED PROVIDER NOTE - NS ED ATTENDING STATEMENT MOD
This was a shared visit with the [facility]. I reviewed and verified the documentation and independently performed the documented:

## 2023-07-26 VITALS
SYSTOLIC BLOOD PRESSURE: 167 MMHG | OXYGEN SATURATION: 97 % | RESPIRATION RATE: 17 BRPM | HEART RATE: 65 BPM | DIASTOLIC BLOOD PRESSURE: 87 MMHG

## 2023-07-26 DIAGNOSIS — E78.00 PURE HYPERCHOLESTEROLEMIA, UNSPECIFIED: ICD-10-CM

## 2023-07-26 DIAGNOSIS — I25.10 ATHEROSCLEROTIC HEART DISEASE OF NATIVE CORONARY ARTERY WITHOUT ANGINA PECTORIS: ICD-10-CM

## 2023-07-26 DIAGNOSIS — I10 ESSENTIAL (PRIMARY) HYPERTENSION: ICD-10-CM

## 2023-07-26 PROBLEM — I35.1 NONRHEUMATIC AORTIC (VALVE) INSUFFICIENCY: Chronic | Status: ACTIVE | Noted: 2023-05-03

## 2023-07-26 PROBLEM — E78.5 HYPERLIPIDEMIA, UNSPECIFIED: Chronic | Status: ACTIVE | Noted: 2023-05-03

## 2023-07-26 PROBLEM — I67.89 OTHER CEREBROVASCULAR DISEASE: Chronic | Status: ACTIVE | Noted: 2023-05-03

## 2023-07-26 LAB
ANION GAP SERPL CALC-SCNC: 11 MMOL/L — SIGNIFICANT CHANGE UP (ref 5–17)
BUN SERPL-MCNC: 14 MG/DL — SIGNIFICANT CHANGE UP (ref 7–23)
CALCIUM SERPL-MCNC: 8.8 MG/DL — SIGNIFICANT CHANGE UP (ref 8.4–10.5)
CHLORIDE SERPL-SCNC: 106 MMOL/L — SIGNIFICANT CHANGE UP (ref 96–108)
CO2 SERPL-SCNC: 24 MMOL/L — SIGNIFICANT CHANGE UP (ref 22–31)
CREAT SERPL-MCNC: 0.8 MG/DL — SIGNIFICANT CHANGE UP (ref 0.5–1.3)
EGFR: 77 ML/MIN/1.73M2 — SIGNIFICANT CHANGE UP
GLUCOSE SERPL-MCNC: 108 MG/DL — HIGH (ref 70–99)
HCT VFR BLD CALC: 37.2 % — SIGNIFICANT CHANGE UP (ref 34.5–45)
HGB BLD-MCNC: 12.9 G/DL — SIGNIFICANT CHANGE UP (ref 11.5–15.5)
MAGNESIUM SERPL-MCNC: 2 MG/DL — SIGNIFICANT CHANGE UP (ref 1.6–2.6)
MCHC RBC-ENTMCNC: 31.6 PG — SIGNIFICANT CHANGE UP (ref 27–34)
MCHC RBC-ENTMCNC: 34.7 GM/DL — SIGNIFICANT CHANGE UP (ref 32–36)
MCV RBC AUTO: 91.2 FL — SIGNIFICANT CHANGE UP (ref 80–100)
NRBC # BLD: 0 /100 WBCS — SIGNIFICANT CHANGE UP (ref 0–0)
PLATELET # BLD AUTO: 183 K/UL — SIGNIFICANT CHANGE UP (ref 150–400)
POTASSIUM SERPL-MCNC: 4.2 MMOL/L — SIGNIFICANT CHANGE UP (ref 3.5–5.3)
POTASSIUM SERPL-SCNC: 4.2 MMOL/L — SIGNIFICANT CHANGE UP (ref 3.5–5.3)
RBC # BLD: 4.08 M/UL — SIGNIFICANT CHANGE UP (ref 3.8–5.2)
RBC # FLD: 12.3 % — SIGNIFICANT CHANGE UP (ref 10.3–14.5)
SODIUM SERPL-SCNC: 141 MMOL/L — SIGNIFICANT CHANGE UP (ref 135–145)
WBC # BLD: 5.09 K/UL — SIGNIFICANT CHANGE UP (ref 3.8–10.5)
WBC # FLD AUTO: 5.09 K/UL — SIGNIFICANT CHANGE UP (ref 3.8–10.5)

## 2023-07-26 PROCEDURE — 80048 BASIC METABOLIC PNL TOTAL CA: CPT

## 2023-07-26 PROCEDURE — 82550 ASSAY OF CK (CPK): CPT

## 2023-07-26 PROCEDURE — 82553 CREATINE MB FRACTION: CPT

## 2023-07-26 PROCEDURE — 99285 EMERGENCY DEPT VISIT HI MDM: CPT

## 2023-07-26 PROCEDURE — 99239 HOSP IP/OBS DSCHRG MGMT >30: CPT

## 2023-07-26 PROCEDURE — 84484 ASSAY OF TROPONIN QUANT: CPT

## 2023-07-26 PROCEDURE — 80053 COMPREHEN METABOLIC PANEL: CPT

## 2023-07-26 PROCEDURE — 85025 COMPLETE CBC W/AUTO DIFF WBC: CPT

## 2023-07-26 PROCEDURE — 85027 COMPLETE CBC AUTOMATED: CPT

## 2023-07-26 PROCEDURE — 93306 TTE W/DOPPLER COMPLETE: CPT | Mod: 26

## 2023-07-26 PROCEDURE — 36415 COLL VENOUS BLD VENIPUNCTURE: CPT

## 2023-07-26 PROCEDURE — 93306 TTE W/DOPPLER COMPLETE: CPT

## 2023-07-26 PROCEDURE — 71045 X-RAY EXAM CHEST 1 VIEW: CPT

## 2023-07-26 PROCEDURE — 81003 URINALYSIS AUTO W/O SCOPE: CPT

## 2023-07-26 PROCEDURE — 83735 ASSAY OF MAGNESIUM: CPT

## 2023-07-26 RX ORDER — AMLODIPINE BESYLATE 2.5 MG/1
1 TABLET ORAL
Refills: 0 | DISCHARGE

## 2023-07-26 RX ORDER — ASPIRIN/CALCIUM CARB/MAGNESIUM 324 MG
81 TABLET ORAL DAILY
Refills: 0 | Status: DISCONTINUED | OUTPATIENT
Start: 2023-07-26 | End: 2023-07-26

## 2023-07-26 RX ORDER — LISINOPRIL 2.5 MG/1
10 TABLET ORAL DAILY
Refills: 0 | Status: DISCONTINUED | OUTPATIENT
Start: 2023-07-26 | End: 2023-07-26

## 2023-07-26 RX ORDER — AMLODIPINE BESYLATE 2.5 MG/1
10 TABLET ORAL DAILY
Refills: 0 | Status: DISCONTINUED | OUTPATIENT
Start: 2023-07-26 | End: 2023-07-26

## 2023-07-26 RX ORDER — RAMIPRIL 5 MG
1 CAPSULE ORAL
Refills: 0 | DISCHARGE

## 2023-07-26 RX ORDER — ATORVASTATIN CALCIUM 80 MG/1
40 TABLET, FILM COATED ORAL AT BEDTIME
Refills: 0 | Status: DISCONTINUED | OUTPATIENT
Start: 2023-07-26 | End: 2023-07-26

## 2023-07-26 RX ORDER — CLOPIDOGREL BISULFATE 75 MG/1
75 TABLET, FILM COATED ORAL DAILY
Refills: 0 | Status: DISCONTINUED | OUTPATIENT
Start: 2023-07-26 | End: 2023-07-26

## 2023-07-26 RX ADMIN — ATORVASTATIN CALCIUM 40 MILLIGRAM(S): 80 TABLET, FILM COATED ORAL at 01:30

## 2023-07-26 RX ADMIN — Medication 81 MILLIGRAM(S): at 11:43

## 2023-07-26 RX ADMIN — CLOPIDOGREL BISULFATE 75 MILLIGRAM(S): 75 TABLET, FILM COATED ORAL at 11:42

## 2023-07-26 NOTE — H&P ADULT - ASSESSMENT
71 y/o female with HTN, HPL, known CAD s/p JENNIFER pRCA last May, admitted with feeling of heat coming from her abdomen to her chest and face, associated with palpitations. She denies chest pain, dizziness, SOB. Labs reassuring, EKG non ischemic

## 2023-07-26 NOTE — DISCHARGE NOTE NURSING/CASE MANAGEMENT/SOCIAL WORK - NSDCPEFALRISK_GEN_ALL_CORE
For information on Fall & Injury Prevention, visit: [email or web address] OR  [email or web address] OR  https://www.cdc.gov/steadi/patient.html

## 2023-07-26 NOTE — DISCHARGE NOTE PROVIDER - HOSPITAL COURSE
Pt left AMA- reviewed with pt advised to await ECHO results and that leaving AMA can lead to injury, harm or death for which she verbalized understanding.  RX for Lisinopril sent to preferred Pharmacy. This is a 73 y/o female, diagnosed with breast cancer in December 2023 (awaiting lumpectomy in September), HTN, Hypercholesterolemia, known CAD s/p JENNIFER pRCA on 5/4/23, presents to the ER with c/o heat wave started from abdomen to chest up to her face. Symptoms woke her up from sleep at 3AM, lasting a few min, recurring multiple times throughout the day. She admits to palpitations and anxiety but denies chest pain, SOB, dizziness.   In the ER, vitals with /91, HR 69, R 18, T 36.9, O2 sat 98%. Labs stable with trop 10-12, EKG non ischemic, UA negative. Patient admitted for further ischemic workup including CCTA and TTE.     Patient underwent TTE (7/26/23) revealing: hyperdynamic left ventricular systolic function (LVEF 75%), mild symmetric LVH, no significant valvular disease, no pericardial effusion.     Patient was pending CCTA but was unwilling to wait to undergo study. Asked patient why she wanted to leave, reports that she no longer wishes to wait.      Despite extensive discussion regarding the benefits of staying vs the risks associated with leaving against medical advice, patient has decided to leave against medical advice (AMA). She has a normal mental status (AAOX3) and full decisional capacity. I explained the risks, benefits, and alternatives to treatment. The patient understands her condition and the risks of leaving AMA, including but not limited to: injury, harm, or death and verbalizes understanding.      Pt had the opportunity to ask questions about her medical condition. The patient's support person/spouse is also aware of the condition and the patient's desire to leave AMA. Medical staff believes the patient fully understands what has been explained and answered. The patient has been informed that she may return for care at any time, and has been referred to her local medical physician for follow up ASAP. I informed Dr. Nevarez of this who is aware; Dr. Nevarez states that he has spoken to patient's outpatient cardiologist. Pt signed form to sign out AMA and accepts responsibility for any and all results of this decision.     Patient was prescribed Ramipril 2.5 mg daily last week but did not start taking; was instructed to continue this on discharge home. Patient states that she has already picked up the medication and understands need to begin the medication.

## 2023-07-26 NOTE — DISCHARGE NOTE PROVIDER - NSDCCPCAREPLAN_GEN_ALL_CORE_FT
PRINCIPAL DISCHARGE DIAGNOSIS  Diagnosis: Chest pain  Assessment and Plan of Treatment: - You came into the hospital with complaints of chest pain. Blood work was drawn and an EKG was performed and you DID NOT have a heart attack.   - An echocardiogram or ultrasound of your heart was performed, however, you are leaving against medicaal advice as the results are not yet back. A CT scan of your coronary arteries was also advised, however, you did not want this scan at this time.   - Please call and schedule an appointment to follow up with Dr. Vides within 1 week of discahrge from the hospital.      SECONDARY DISCHARGE DIAGNOSES  Diagnosis: HTN (hypertension)  Assessment and Plan of Treatment: - Hypertension, commonly called high blood pressure, is when the force of blood pumping through your arteries is too strong, WHICH YOU HAVE A KNOWN HISTORY OF.  Hypertension forces your heart to work harder to pump blood. Your arteries may become narrow or stiff. Having untreated or uncontrolled hypertension for a long period of time can cause heart attack, stroke, kidney disease, and other problems.   - Your blood pressure was HIGHER than it should be for which another medication was started (Lisinipril 10mg). Be sure to follow a low salt diet. If you have been prescribed antihypertensive medications to control your blood pressure, be sure to take them every day as prescribed and do not miss any doses, the medications do not work if they are not taken consistently. Follow up with your Primary Care Doctor and have your Blood Pressure checked.     PRINCIPAL DISCHARGE DIAGNOSIS  Diagnosis: Chest pain  Assessment and Plan of Treatment: - You came into the hospital with complaints of chest pain. Blood work was drawn and an EKG was performed and you DID NOT have a heart attack.   - An echocardiogram or ultrasound of your heart was performed, however, you are leaving against medicaal advice as the results are not yet back. A CT scan of your coronary arteries was also advised, however, you did not want this scan at this time.   - Please call and schedule an appointment to follow up with Dr. Vides within 1 week of discahrge from the hospital.  - Please continue to take your Aspirin 81 mg daily and Plavix 75 mg daily as prescribed.      SECONDARY DISCHARGE DIAGNOSES  Diagnosis: HTN (hypertension)  Assessment and Plan of Treatment: - Hypertension, commonly called high blood pressure, is when the force of blood pumping through your arteries is too strong, WHICH YOU HAVE A KNOWN HISTORY OF.  Hypertension forces your heart to work harder to pump blood. Your arteries may become narrow or stiff. Having untreated or uncontrolled hypertension for a long period of time can cause heart attack, stroke, kidney disease, and other problems.   - Your blood pressure was HIGHER than it should be. PLEASE START TAKING THE RAMIPRIL 2.5 MG DAILY that you have been prescribed. Please continue amlodipine 10 mg daily that you are already prescribed. Be sure to follow a low salt diet. If you have been prescribed antihypertensive medications to control your blood pressure, be sure to take them every day as prescribed and do not miss any doses, the medications do not work if they are not taken consistently. Follow up with your Primary Care Doctor and have your Blood Pressure checked.    Diagnosis: Hypercholesterolemia  Assessment and Plan of Treatment: Please continue your atorvastatin 40 mg daily as prescribed.

## 2023-07-26 NOTE — H&P ADULT - NSICDXPASTMEDICALHX_GEN_ALL_CORE_FT
PAST MEDICAL HISTORY:  Aortic insufficiency     CAD (coronary artery disease)     Cancer of breast     Cerebral microvascular disease     HLD (hyperlipidemia)     HTN (hypertension)

## 2023-07-26 NOTE — PATIENT PROFILE ADULT - FALL HARM RISK - HARM RISK INTERVENTIONS

## 2023-07-26 NOTE — H&P ADULT - NSHPPHYSICALEXAM_GEN_ALL_CORE
T(C): 36.5 (07-25-23 @ 23:30), Max: 36.9 (07-25-23 @ 18:31)  HR: 62 (07-25-23 @ 23:30) (62 - 69)  BP: 147/74 (07-25-23 @ 23:30) (147/74 - 188/91)  RR: 18 (07-25-23 @ 23:30) (16 - 18)  SpO2: 98% (07-25-23 @ 23:30) (97% - 98%)  Wt(kg): --    Appearance: Normal	  HEENT:   Normal oral mucosa, PERRL, EOMI	  Neck: Supple, - JVD; No Carotid Bruit and 2+ pulses B/L  Cardiovascular: Normal S1 S2, No JVD, No murmurs  Respiratory: Lungs clear to auscultation, no Rales, Rhonchi, Wheezing	  Gastrointestinal:  Soft, Non-tender, + BS	  Skin: No rashes, No ecchymoses, No cyanosis  Extremities: Normal range of motion, No clubbing, cyanosis or edema  Vascular: Femoral pulses 2+ b/l without bruit, DP 1+ b/l, PT 1+ b/l  Neurologic: Non-focal  Psychiatry: A & O x 3, Mood & affect appropriate

## 2023-07-26 NOTE — H&P ADULT - NS PANP COMMENT GEN_ALL_CORE FT
I saw, evaluated, and examined the patient at the bedside. I discussed the patient’s case with the ACP and agree with APC’s note, ROS findings, physical exam, assessment, and plan as documented in the ACP’s note, with the following addendum.    Total of 55 minutes were spent in reviewing the patient’s chart, examining the patient, and discussing patient’s assessment and plan.     71 yo lady PMHx of HTN, HLD, and recently diagnosed breast cancer and CAD s/p JENNIFER to RCA May/2023 who presented to the ED with chest discomfort described as 'heat wave' in the chest and abdomen, non-exertional and intermittent.     HsTrop negative  EKG NSR without new ischemic findings    Assessment  1) CAD s/p JENNIFER to RCA May/2023  2) Possibly cardiac chest pain - ACS ruled out  3) HTN  4) HLD    Plan  1) Obtain CCTA and TTE - if normal, will discharge later today  2) DAPT with ASA 81mg daily and plavix 75 mg daily  3) Amlodipine 10mg daily  4) High intensity lipitor    Margaret Nevarez MD

## 2023-07-26 NOTE — H&P ADULT - NSHPREVIEWOFSYSTEMS_GEN_ALL_CORE
GENERAL, CONSTITUTIONAL : denies recent weight loss, fever, chills  EYES, VISION: denies changes in vision   EARS, NOSE, THROAT: denies hearing loss  HEART, CARDIOVASCULAR: +  palpitations. Denies chest pain, arrhythmia, SOB,  LE edema, claudication  RESPIRATORY: Denies cough, SOB, wheezing, PND, orthopnea  GASTROINTESTINAL: Denies abdominal pain, heartburn, bloody stool, dark tarry stool  GENITOURINARY: Denies frequent urination, urgency  MUSCULOSKELETAL denies joint pain or swelling, restricted motion, musculoskeletal pain.   SKIN & INTEGUMENTARY Denies rashes, sores, blisters, blisters, growths.  NEUROLOGICAL: Denies numbness or tingling sensations, sensation loss, burning.   PSYCHIATRIC: Denies nervousness, anxiety, depression  ENDOCRINE Denies heat or cold intolerance, excessive thirst  HEMATOLOGIC/LYMPHATIC: Denies abnormal bleeding, bleeding of any kind

## 2023-07-26 NOTE — DISCHARGE NOTE NURSING/CASE MANAGEMENT/SOCIAL WORK - PATIENT PORTAL LINK FT
You can access the FollowMyHealth Patient Portal offered by [facility] by registering at the following website: [email or web address]. By joining [facility]’s FollowMyHealth portal, you will also be able to view your health information using other applications (apps) compatible with our system.

## 2023-07-26 NOTE — DISCHARGE NOTE PROVIDER - CARE PROVIDER_API CALL
Shilo Vides  Cardiovascular Disease  [address] [place] [address]  Phone: [phone]  Fax: [phone]  Follow Up Time: 1 week

## 2023-07-26 NOTE — H&P ADULT - NSHPLABSRESULTS_GEN_ALL_CORE
13.6   6.84  )-----------( 206      ( 2023 21:29 )             39.1           144  |  106  |  17  ----------------------------<  101<H>  4.3   |  26  |  1.09    Ca    10.0      2023 19:36    TPro  7.7  /  Alb  4.7  /  TBili  0.4  /  DBili  x   /  AST  34  /  ALT  44  /  AlkPhos  112  07-25          CARDIAC MARKERS ( 2023 19:36 )  x     / x     / 94 U/L / x     / 2.7 ng/mL        Urinalysis Basic - ( 2023 21:47 )    Color: Yellow / Appearance: Clear / S.015 / pH: x  Gluc: x / Ketone: NEGATIVE  / Bili: Negative / Urobili: 0.2 E.U./dL   Blood: x / Protein: NEGATIVE mg/dL / Nitrite: NEGATIVE   Leuk Esterase: NEGATIVE / RBC: x / WBC x   Sq Epi: x / Non Sq Epi: x / Bacteria: x

## 2023-07-26 NOTE — DISCHARGE NOTE PROVIDER - ATTENDING DISCHARGE PHYSICAL EXAMINATION:
GENERAL: No acute distress, well-developed  HEAD:  Atraumatic, Normocephalic  ENT: EOMI, PERRLA, conjunctiva and sclera clear, Neck supple, No JVD, moist mucosa  CHEST/LUNG: Clear to auscultation bilaterally; No wheeze, equal breath sounds bilaterally   BACK: No spinal tenderness  HEART: Regular rate and rhythm; No murmurs, rubs, or gallops  ABDOMEN: Soft, Nontender, Nondistended; Bowel sounds present  EXTREMITIES:  No clubbing, cyanosis, or edema  PSYCH: Nl behavior, nl affect  NEUROLOGY: AAOx3, non-focal, cranial nerves intact  SKIN: Normal color, No rashes or lesions

## 2023-07-26 NOTE — DISCHARGE NOTE PROVIDER - NSDCMRMEDTOKEN_GEN_ALL_CORE_FT
amLODIPine 10 mg oral tablet: 1 orally once a day  Aspirin Enteric Coated 81 mg oral delayed release tablet: 1 tab(s) orally once a day  atorvastatin 40 mg oral tablet: 1 tab(s) orally once a day (at bedtime)  clopidogrel 75 mg oral tablet: 1 tab(s) orally once a day   amLODIPine 10 mg oral tablet: 1 orally once a day  Aspirin Enteric Coated 81 mg oral delayed release tablet: 1 tab(s) orally once a day  atorvastatin 40 mg oral tablet: 1 tab(s) orally once a day (at bedtime)  clopidogrel 75 mg oral tablet: 1 tab(s) orally once a day  ramipril 2.5 mg oral capsule: 1 orally once a day

## 2023-07-26 NOTE — H&P ADULT - HISTORY OF PRESENT ILLNESS
This is a 71 y/o female, diagnosed with breast cancer in December 2023 (awaiting lumpectomy in September), HTN, Hypercholesterolemia, known CAD s/p JENNIFER pRCA on 5/4/23, presents to the ER with c/o heat wave started from abdomen to chest up to her face. Symptoms woke her up from sleep at 3AM, lasting a few min, recurring multiple times throughout the day. She admits to palpitations and anxiety but denies chest pain, SOB, dizziness.   In the ER, vitals with /91, HR 69, R 18, T 36.9, O2 sat 98%. Labs stable with trop 10-12, EKG non ischemic, UA negative.   She is admitted for CCTA Chief complaint: chest pain    HPI    This is a 73 y/o female, diagnosed with breast cancer in December 2023 (awaiting lumpectomy in September), HTN, Hypercholesterolemia, known CAD s/p JENNIFER pRCA on 5/4/23, presents to the ER with c/o heat wave started from abdomen to chest up to her face. Symptoms woke her up from sleep at 3AM, lasting a few min, recurring multiple times throughout the day. She admits to palpitations and anxiety but denies chest pain, SOB, dizziness.   In the ER, vitals with /91, HR 69, R 18, T 36.9, O2 sat 98%. Labs stable with trop 10-12, EKG non ischemic, UA negative.   She is admitted for CCTA

## 2023-07-26 NOTE — H&P ADULT - PROBLEM SELECTOR PLAN 1
R/O ACS with 2 negative trop T, high sensitivity  Telemetry  Continue Aspirin and Plavix  NPO tonight for CCTA in AM

## 2023-08-01 DIAGNOSIS — I10 ESSENTIAL (PRIMARY) HYPERTENSION: ICD-10-CM

## 2023-08-01 DIAGNOSIS — E78.00 PURE HYPERCHOLESTEROLEMIA, UNSPECIFIED: ICD-10-CM

## 2023-08-01 DIAGNOSIS — R07.89 OTHER CHEST PAIN: ICD-10-CM

## 2023-08-01 DIAGNOSIS — R07.9 CHEST PAIN, UNSPECIFIED: ICD-10-CM

## 2023-08-01 DIAGNOSIS — Z95.5 PRESENCE OF CORONARY ANGIOPLASTY IMPLANT AND GRAFT: ICD-10-CM

## 2023-08-01 DIAGNOSIS — C50.919 MALIGNANT NEOPLASM OF UNSPECIFIED SITE OF UNSPECIFIED FEMALE BREAST: ICD-10-CM

## 2023-08-01 DIAGNOSIS — Z88.1 ALLERGY STATUS TO OTHER ANTIBIOTIC AGENTS STATUS: ICD-10-CM

## 2023-08-01 DIAGNOSIS — I25.10 ATHEROSCLEROTIC HEART DISEASE OF NATIVE CORONARY ARTERY WITHOUT ANGINA PECTORIS: ICD-10-CM

## 2025-07-06 NOTE — PATIENT PROFILE ADULT - NSPROPTRIGHTSUPPORTPERSON_GEN_A_NUR
[Maximal Pain Intensity: 0/10] : 0/10 [Least Pain Intensity: 0/10] : 0/10 [90: Able to carry normal activity; minor signs or symptoms of disease.] : 90: Able to carry normal activity; minor signs or symptoms of disease.  [ECOG Performance Status: 0 - Fully active, able to carry on all pre-disease performance without restriction] : Performance Status: 0 - Fully active, able to carry on all pre-disease performance without restriction yes